# Patient Record
Sex: FEMALE | Race: WHITE | NOT HISPANIC OR LATINO | URBAN - METROPOLITAN AREA
[De-identification: names, ages, dates, MRNs, and addresses within clinical notes are randomized per-mention and may not be internally consistent; named-entity substitution may affect disease eponyms.]

---

## 2021-03-29 ENCOUNTER — HOSPITAL ENCOUNTER (OUTPATIENT)
Dept: FAMILY MEDICINE CLINIC | Facility: CLINIC | Age: 71
Discharge: HOME | End: 2021-03-29
Attending: FAMILY MEDICINE

## 2021-03-29 ENCOUNTER — CONVERSION ENCOUNTER (OUTPATIENT)
Dept: FAMILY MEDICINE CLINIC | Facility: CLINIC | Age: 71
End: 2021-03-29

## 2021-03-29 ENCOUNTER — OFFICE VISIT CONVERTED (OUTPATIENT)
Dept: FAMILY MEDICINE CLINIC | Facility: CLINIC | Age: 71
End: 2021-03-29
Attending: FAMILY MEDICINE

## 2021-03-29 LAB
25(OH)D3 SERPL-MCNC: 37.5 NG/ML (ref 30–100)
ANION GAP SERPL CALC-SCNC: 11 MMOL/L (ref 8–19)
BASOPHILS # BLD AUTO: 0.01 10*3/UL (ref 0–0.2)
BASOPHILS NFR BLD AUTO: 0.2 % (ref 0–3)
BUN SERPL-MCNC: 24 MG/DL (ref 5–25)
BUN/CREAT SERPL: 65 {RATIO} (ref 6–20)
CALCIUM SERPL-MCNC: 8.4 MG/DL (ref 8.7–10.4)
CHLORIDE SERPL-SCNC: 98 MMOL/L (ref 99–111)
CHOLEST SERPL-MCNC: 170 MG/DL (ref 107–200)
CHOLEST/HDLC SERPL: 2.7 {RATIO} (ref 3–6)
CONV ABS IMM GRAN: 0.03 10*3/UL (ref 0–0.2)
CONV CO2: 36 MMOL/L (ref 22–32)
CONV IMMATURE GRAN: 0.6 % (ref 0–1.8)
CREAT UR-MCNC: 0.37 MG/DL (ref 0.5–0.9)
DEPRECATED RDW RBC AUTO: 59.6 FL (ref 36.4–46.3)
EOSINOPHIL # BLD AUTO: 0.03 10*3/UL (ref 0–0.7)
EOSINOPHIL # BLD AUTO: 0.6 % (ref 0–7)
ERYTHROCYTE [DISTWIDTH] IN BLOOD BY AUTOMATED COUNT: 15.5 % (ref 11.7–14.4)
EST. AVERAGE GLUCOSE BLD GHB EST-MCNC: 117 MG/DL
GFR SERPLBLD BASED ON 1.73 SQ M-ARVRAT: >60 ML/MIN/{1.73_M2}
GLUCOSE SERPL-MCNC: 127 MG/DL (ref 65–99)
HBA1C MFR BLD: 5.7 % (ref 3.5–5.7)
HCT VFR BLD AUTO: 41.1 % (ref 37–47)
HDLC SERPL-MCNC: 64 MG/DL (ref 40–60)
HGB BLD-MCNC: 13.1 G/DL (ref 12–16)
LDLC SERPL CALC-MCNC: 92 MG/DL (ref 70–100)
LYMPHOCYTES # BLD AUTO: 0.52 10*3/UL (ref 1–5)
LYMPHOCYTES NFR BLD AUTO: 9.9 % (ref 20–45)
MCH RBC QN AUTO: 33.6 PG (ref 27–31)
MCHC RBC AUTO-ENTMCNC: 31.9 G/DL (ref 33–37)
MCV RBC AUTO: 105.4 FL (ref 81–99)
MONOCYTES # BLD AUTO: 0.17 10*3/UL (ref 0.2–1.2)
MONOCYTES NFR BLD AUTO: 3.2 % (ref 3–10)
NEUTROPHILS # BLD AUTO: 4.48 10*3/UL (ref 2–8)
NEUTROPHILS NFR BLD AUTO: 85.5 % (ref 30–85)
NRBC CBCN: 0 % (ref 0–0.7)
OSMOLALITY SERPL CALC.SUM OF ELEC: 298 MOSM/KG (ref 273–304)
PLATELET # BLD AUTO: 183 10*3/UL (ref 130–400)
PMV BLD AUTO: 11.3 FL (ref 9.4–12.3)
POTASSIUM SERPL-SCNC: 4.4 MMOL/L (ref 3.5–5.3)
RBC # BLD AUTO: 3.9 10*6/UL (ref 4.2–5.4)
SODIUM SERPL-SCNC: 141 MMOL/L (ref 135–147)
TRIGL SERPL-MCNC: 68 MG/DL (ref 40–150)
VLDLC SERPL-MCNC: 14 MG/DL (ref 5–37)
WBC # BLD AUTO: 5.24 10*3/UL (ref 4.8–10.8)

## 2021-03-30 LAB — HCV AB SER DONR QL: <0.1 S/CO RATIO (ref 0–0.9)

## 2021-04-05 ENCOUNTER — TELEMEDICINE CONVERTED (OUTPATIENT)
Dept: FAMILY MEDICINE CLINIC | Facility: CLINIC | Age: 71
End: 2021-04-05
Attending: FAMILY MEDICINE

## 2021-04-06 ENCOUNTER — HOSPITAL ENCOUNTER (OUTPATIENT)
Dept: OTHER | Facility: HOSPITAL | Age: 71
Discharge: HOME | End: 2021-04-06
Attending: FAMILY MEDICINE

## 2021-05-07 NOTE — PROGRESS NOTES
Progress Note      Patient Name: Naomi Lopes   Patient ID: [id number]   Sex: Female   YOB: 1950    Primary Care Provider: Isma Magallon DO   Referring Provider: Isma Magallon DO    Visit Date: April 5, 2021    Provider: Isma Magallon DO   Location: [facility]   Location Address: [address] [place] [address]   [place]  [address]   Location Phone: [phone]          Chief Complaint     FOLLOW UP       History Of Present Illness  TELEHEALTH TELEPHONE VISIT  Chief Complaint: FOLLOW UP VISIT   Naomi Lopes is a 70 year old /White female who is presenting for evaluation via telehealth video visit conducted via [employer] [place] [facility] [place]. Verbal consent obtained before beginning visit. Family members presents with the patient during the visit.   Provider spent 20 minutes with patient during telehealth visit.   The following staff were present during this visit: Isma Magallon DO   Past Medical History/Overview of Patient Symptoms     A.) F/U VISIT : The patient presented as a new patient during her most recent visit. History of CVA w/ hemiplegia, atrial fibrillation etc. She was referred to podiatry (for foot care) and cardiology. She is scheduled in office with both office during the upcoming weeks. She denies any concerns w/ the current dosages of her medications.     B.) TREMOR : Right-sided UE tremor. Patient reports sxs when attempting to perform tasks like hold a cup of coffee. She reports at least several months of sxs.       Past Medical History  Disease Name Date Onset Notes   Atrial fibrillation --  --    Cardiac Pacemaker --  [employer]   Cardiomyopathy --  --    CHF (congestive heart failure) --  --    CVA (cerebral vascular accident); history --  --    Depression --  --    Hyperlipidemia --  --    Mitral valve regurgitation --  --    Tachy-brian syndrome --  --    Tricuspid regurgitation --  --    Urinary Retention --  --          Past  Surgical History  Procedure Name Date Notes   Cardiac Catherization --  --    Hip Surgery --  --    Knee replacement, left --  --    Pacemaker --  --    Tubal ligation --  --          Medication List  Name Date Started Instructions   acetaminophen 500 mg oral tablet 03/29/2021 take 2 tablets (1,000 mg) by oral route every 4 hours as needed not to exceed 8 tablets per 24hrs   baclofen 10 mg oral tablet 03/29/2021 take 1 tablet by oral route 2 times a day for 90 days   Celexa 20 mg oral tablet 03/29/2021 take 1 tablet (20 mg) by oral route once daily in the morning for 90 days   dexamethasone 2 mg oral tablet  take 2 tablets by oral route 3 times a day   Flomax 0.4 mg oral capsule 03/29/2021 take 1 capsule (0.4 mg) by oral route once daily 1/2 hour following the same meal each day for 90 days   gabapentin 300 mg oral capsule 03/29/2021 take 2 capsules by oral route 2 times a day for 30 days   Lasix 40 mg oral tablet 03/29/2021 take 1 tablet (40 mg) by oral route 2 times per day for 90 days   metoprolol tartrate 25 mg oral tablet 03/29/2021 take 1 tablet (25 mg) by oral route once daily for 90 days   potassium chloride 20 mEq oral tablet extended release 03/29/2021 take 1 tablet (20 meq) by oral route 2 times per day with food for 90 days   Vitamin D3 25 mcg (1,000 unit) oral capsule 03/29/2021 take 1 capsule by oral route daily for 90 days   warfarin 3 mg oral tablet 03/29/2021 take 1 tablet (3 mg) by oral route once daily for 90 days   warfarin 4 mg oral tablet 03/29/2021 take 1 tablet (4 mg) by oral route once daily for 90 days         Allergy List  Allergen Name Date Reaction Notes   NO KNOWN DRUG ALLERGIES --  --  --          Family Medical History  Disease Name Relative/Age Notes   Congestive Heart Failure  --    Hypertension  --          Social History  Finding Status Start/Stop Quantity Notes   Tobacco Never --/-- --  --          Review of Systems  · Constitutional  o Denies  o : fatigue,  fever  · Eyes  o Denies  o : discharge from eye, redness  · HENT  o Denies  o : headaches, congestion  · Cardiovascular  o Denies  o : chest pain, palpitations  · Respiratory  o Denies  o : shortness of breath, wheezing, cough  · Gastrointestinal  o Denies  o : vomiting, diarrhea, constipation  · Genitourinary  o Denies  o : dysuria, hematuria  · Integument  o Denies  o : rash, new skin lesions  · Neurologic  o Admits  o : right sided UE tremor  o Denies  o : altered mental status, seizures  · Endocrine  o Denies  o : cold intolerance, heat intolerance  · Psychiatric  o Denies  o : anxiety, depression  · Heme-Lymph  o Denies  o : lymph node enlargement, tenderness      Physical Examination  · Constitutional  o Appearance  o : alert, in no acute distress  · Head and Face  o HEENT  o : hearing is normal for conversation   · Respiratory  o Respiratory Effort  o : breathing appears unlabored via video  · Skin and Subcutaneous Tissue  o General Inspection  o : normal tone  · Neurologic  o Mental Status Examination  o :   § Orientation  § : grossly oriented to person, place and time  · Psychiatric  o General  o : normal affect and calm          Assessment  · Atrial fibrillation     427.31/I48.91    A.) Patient will be establishing care with [facility] - intial visit scheduled for 4.6.21 - requested INR during that visit - family will call regarding INR - will inquire here in our office regarding at home INR monitoring.     · History of CVA (cerebrovascular accident)     V12.54/Z86.73    A.) See below.     · Hemiplegia     342.90/G81.90    A.) See below.     · Tremor     781.0/R25.1    A.) Will refer to neurology for an evaluation and recommendations.     · Pneumonitis     486/J18.9    A.) Post COVID - using LSELIE PRN once a day - unclear whether medication is needed. Advise to start to wean off. Contact physician with any concerns.     · Low serum vitamin C     790.6/R79.89    A.) Low vitamin C per patient's family -  prescribed vitamin C 500 mg in the past - will resend as noted.     · Low calcium levels     275.41/E83.51    A.) Labs reviewed with patient - low calcium level - will send supplement to pharmacy.         Plan  · Orders  o ACO-39: Current medications updated and reviewed (1159F, ) - - 04/05/2021  o NEUROLOGY CONSULTATION. (NEURO) - V12.54/Z86.73, 781.0/R25.1, 342.90/G81.90 - 04/05/2021  · Medications  o Vitamin C 500 mg oral tablet   SIG: take 1 tablet by oral route daily for 90 days   DISP: (90) Tablet with 3 refills  Prescribed on 04/05/2021     o Calcium 600 600 mg calcium (1,500 mg) oral tablet   SIG: take 1 tablet by oral route daily for 90 days   DISP: (90) Tablet with 3 refills  Prescribed on 04/05/2021     · Instructions  o Plan Of Care: See assessment section.  o Take all medications as prescribed/directed.  o SCHEDULE FOLLOW UP VISIT AFTER SEEING SPECIALISTS - END OF APRIL.            Electronically Signed by: Isma Magallon DO -Author on April 5, 2021 04:15:04 PM

## 2021-05-07 NOTE — PROGRESS NOTES
Progress Note      Patient Name: Naomi Lopes   Patient ID: [id number]   Sex: Female   YOB: 1950        Visit Date: March 29, 2021    Provider: Isma Magallon DO   Location: [facility]   Location Address: [address] [place] [address]   [place]  [address]   Location Phone: [phone]          Chief Complaint     Establish care, medication reconciliation       History Of Present Illness  Naomi Lopes is a 70 year old /White female who presents for evaluation and treatment of:      1.) ESTABLISH CARE : Recent move to [place] from [facility]. The patient presents with an extensive cardiac history, history of CVA, depression, etc. She was followed by PCP - Dr. Barbara Elizondo MD, cardiology - Dr. Javier Best MD.     2.) B/L LOWER EXREMITY PAIN : Prescribed Gabapentin - history of diffuse lower extremity pain + cramping (also prescribed baclofen). Reports that her sxs are stable on the medication. She is not requesting an increase in the dose of her rx.     3.) DEPRESSION : Stable. Prescribed Celexa - reports that her sxs are well-controlled on the rx. No concerning mood sxs c/o by patient during this visit.    4.) ATRIAL FIBRILLATION : Prescribed Coumadin - 4 mg. Reports weekly INR checks during time in [place] - unknown regarding stability of the patient's INR. She denies any palpitations during this visit.     5.) CARDIAC HX : Stable. No new heart sxs c/o per patient. No complaints regarding current medications. No complaints of difficulty in breathing.    6.) HISTORY OF CVA : Occurred 2015 w/ residual left-sided weakness.     7.) PREVENTIVE CARE : No known date of most recent labs.       Past Medical History  Disease Name Date Onset Notes   Atrial fibrillation --  --    Cardiac Pacemaker --  [employer]   Cardiomyopathy --  --    CHF (congestive heart failure) --  --    CVA (cerebral vascular accident); history --  --    Hyperlipidemia --  --    Mitral valve  regurgitation --  --    Tachy-brian syndrome --  --    Tricuspid regurgitation --  --          Past Surgical History  Procedure Name Date Notes   Cardiac Catherization --  --    Hip Surgery --  --    Knee replacement, left --  --    Pacemaker --  --    Tubal ligation --  --          Medication List  Name Date Started Instructions   acetaminophen 500 mg oral tablet  take 2 tablets (1,000 mg) by oral route every 4 hours as needed not to exceed 8 tablets per 24hrs   baclofen 10 mg oral tablet  take 1 tablet by oral route 2 times a day   Celexa 20 mg oral tablet  take 1 tablet (20 mg) by oral route once daily in the morning   dexamethasone 2 mg oral tablet  take 2 tablets by oral route 3 times a day   gabapentin 300 mg oral capsule  take 2 capsules by oral route 2 times a day   Lasix 40 mg oral tablet  take 1 tablet (40 mg) by oral route 2 times per day   potassium chloride 20 mEq oral tablet extended release  take 1 tablet (20 meq) by oral route 2 times per day with food   warfarin 4 mg oral tablet  take 1 tablet (4 mg) by oral route once daily         Allergy List  Allergen Name Date Reaction Notes   NO KNOWN DRUG ALLERGIES --  --  --          Family Medical History  Disease Name Relative/Age Notes   Congestive Heart Failure  --    Hypertension  --          Social History  Finding Status Start/Stop Quantity Notes   Tobacco Never --/-- --  --          Review of Systems  · Constitutional  o Denies  o : fever, chills  · Eyes  o Denies  o : double vision, blurred vision  · HENT  o Denies  o : nasal congestion, nasal discharge  · Cardiovascular  o Denies  o : chest pain, irregular heart beats  · Respiratory  o Denies  o : shortness of breath, productive cough  · Gastrointestinal  o Denies  o : nausea, vomiting  · Genitourinary  o Denies  o : dysuria, urinary retention  · Integument  o Denies  o : hair growth change, new skin lesions  · Neurologic  o Denies  o : altered mental status,  seizures  · Musculoskeletal  o Denies  o : joint swelling, limitation of motion  · Endocrine  o Denies  o : cold intolerance, heat intolerance  · Psychiatric  o Denies  o : hallucinations, delusions  · Heme-Lymph  o Denies  o : petechiae, lymph node enlargement or tenderness  · Allergic-Immunologic  o Denies  o : frequent illnesses      Vitals  Date Time BP Position Site L\R Cuff Size HR RR TEMP (F) WT  HT  BMI kg/m2 BSA m2 O2 Sat FR L/min FiO2 HC       03/29/2021 02:35 /70 Sitting    69 - R  97.1     96 %  21%          Physical Examination  · Constitutional  o Appearance  o : alert, in no acute distress, large body habitus   · Head and Face  o HEENT  o : hearing is normal for conversation   · Eyes  o Conjunctivae  o : conjunctivae normal  o Pupils and Irises  o : pupils equal and round  · Neck  o Inspection/Palpation  o : supple  o Thyroid  o : no thyromegaly  · Respiratory  o Respiratory Effort  o : breathing unlabored  o Auscultation of Lungs  o : decreased breath sounds  · Cardiovascular  o Heart  o :   § Auscultation of Heart  § : muffled heart sounds  o Peripheral Vascular System  o :   § Extremities  § : no cyanosis; thickened toenails  · Musculoskeletal  o General  o :   § General Musculoskeletal  § : residual left-sided weakness after stroke  · Skin and Subcutaneous Tissue  o General Inspection  o : area of ecchymosis appreciated of b/l UE  · Neurologic  o Gait and Station  o :   § Gait Screening  § : wheelchair-dependent   · Psychiatric  o Mood and Affect  o : flat affect           Assessment  · CHF (congestive heart failure)     428.0/I50.9    A.) Continue Lasix as prescribed. Refilled as noted.     · Atrial fibrillation     427.31/I48.91    A.) INR checked here in office today - 3.8 - decrease dose to 3 mg daily - goal is to recheck in 1 week via [employer].    · Cardiomyopathy     425.8/I43    A.) Will refer to cardiology as noted below.     · CVA (cerebral vascular accident);  history     434.91/I63.9    A.) See below.     · Mitral valve regurgitation     424.0/I34.0    A.) See above.     · Tricuspid regurgitation     397.0/I07.1    A.) See above.    · Tachy-brian syndrome     427.81/I49.5    A.) See above.     · Screening for lipid disorders     V77.91/Z13.220    A.) Lipid panel as noted.     · Encounter for hepatitis C screening test for low risk patient     V73.89/Z11.59    A.) Hepatitis C screen as noted.     · Screening for diabetes mellitus     V77.1/Z13.1    A.) A1C as noted.     · Low vitamin D level     790.6/R79.89    A.) Vitamin D ordered as noted. Refilled as noted.     · Weakness following cerebrovascular accident (CVA)       Other sequelae of cerebral infarction     438.89/I69.398  Weakness     438.89/R53.1    A.) Will consult with Amaya Sanchez RN, regarding resources available to the patient. Will consult [employer] as noted below, PT/OT/INR check etc.     · Urinary retention     788.20/R33.9    A.) Tamsulosin refilled as noted.     · Lower extremity pain     729.5/M79.606    A.) Controlled substance agreement signed. COLTON reviewed. Gabapentin refilled as noted.     · Thickened nail     703.8/L60.2    A.) Will refer to podiatry for nail and foot care.     · Lung disease     518.89/J98.4    A.) ? Lung disease - family if requesting a referral to pulmonology - advised to hold off until past records are obtained to determine indication for referral.        *Remaining chronic rx refilled as noted. Schedule 1 week follow up visit via video - for review of labs, and re-evaluation. *       Plan  · Orders  o BMP [facility] (10344) - 428.0/I50.9 - 03/29/2021  o CBC with Auto Diff [facility] (97308) - 428.0/I50.9 - 03/29/2021  o Hgb A1c [facility] (77505) - V77.1/Z13.1 - 03/29/2021  o Lipid Panel [facility] (85602) - V77.91/Z13.220 - 03/29/2021  o Vitamin D (25-Hydroxy) Level (87695) - 790.6/R79.89 - 03/29/2021  o ACO-39: Current medications updated and reviewed (1159F, ) - - 03/29/2021  o Hepatitis C  antibody (18894) - V73.89/Z11.59 - 03/29/2021  o IOP - INR (82904) - 427.31/I48.91 - 03/29/2021   3.8  o CARDIOLOGY CONSULTATION (CARDI) - 428.0/I50.9, 427.31/I48.91, 425.8/I43, 427.81/I49.5 - 03/29/2021   Please schedule for ASAP.   Please send all previous cardiology notes uploaded to chart.  o PODIATRY CONSULTATION (PODIA) - 703.8/L60.2, 438.89/I69.398, 438.89/R53.1 - 03/29/2021   Need foot care.   o Home Health Consultation (HOMHE) - 434.91/I63.9, 438.89/I69.398, 438.89/R53.1 - 03/29/2021   Needs to be evaluated for PT/OT. Would like to have her INR checked at home.    Please schedule for ASAP.   · Medications  o metoprolol tartrate 25 mg oral tablet   SIG: take 1 tablet (25 mg) by oral route once daily for 90 days   DISP: (90) Tablet with 1 refills  Prescribed on 03/29/2021     o Vitamin D3 25 mcg (1,000 unit) oral capsule   SIG: take 1 capsule by oral route daily for 90 days   DISP: (90) Capsule with 3 refills  Prescribed on 03/29/2021     o Flomax 0.4 mg oral capsule   SIG: take 1 capsule (0.4 mg) by oral route once daily 1/2 hour following the same meal each day for 90 days   DISP: (90) Capsule with 3 refills  Prescribed on 03/29/2021     o warfarin 3 mg oral tablet   SIG: take 1 tablet (3 mg) by oral route once daily for 90 days   DISP: (90) Tablet with 3 refills  Prescribed on 03/29/2021     o acetaminophen 500 mg oral tablet   SIG: take 2 tablets (1,000 mg) by oral route every 4 hours as needed not to exceed 8 tablets per 24hrs   DISP: (90) Tablet with 2 refills  Adjusted on 03/29/2021     o baclofen 10 mg oral tablet   SIG: take 1 tablet by oral route 2 times a day for 90 days   DISP: (180) Tablet with 1 refills  Adjusted on 03/29/2021     o Celexa 20 mg oral tablet   SIG: take 1 tablet (20 mg) by oral route once daily in the morning for 90 days   DISP: (90) Tablet with 1 refills  Adjusted on 03/29/2021     o gabapentin 300 mg oral capsule   SIG: take 2 capsules by oral route 2 times a day for 30 days    DISP: (120) Capsule with 2 refills  Adjusted on 03/29/2021     o Lasix 40 mg oral tablet   SIG: take 1 tablet (40 mg) by oral route 2 times per day for 90 days   DISP: (180) Tablet with 1 refills  Adjusted on 03/29/2021     o potassium chloride 20 mEq oral tablet extended release   SIG: take 1 tablet (20 meq) by oral route 2 times per day with food for 90 days   DISP: (180) Tablet with 1 refills  Adjusted on 03/29/2021     o warfarin 4 mg oral tablet   SIG: take 1 tablet (4 mg) by oral route once daily for 90 days   DISP: (90) Tablet with 1 refills  Adjusted on 03/29/2021     o Medications have been Reconciled  o Transition of Care or Provider Policy  · Instructions  o Take all medications as prescribed/directed.  o Patient was educated/instructed on their diagnosis, treatment and medications prior to discharge from the clinic today.  o Time In: 2:50  o Time Out: 3:30  o FOLLOW UP VIA VIDEO VISIT IN 1 WEEK.             Electronically Signed by: Isma Magallon DO - on March 30, 2021 01:09:20 PM

## 2021-05-09 VITALS
DIASTOLIC BLOOD PRESSURE: 70 MMHG | OXYGEN SATURATION: 96 % | HEART RATE: 69 BPM | TEMPERATURE: 97.1 F | SYSTOLIC BLOOD PRESSURE: 116 MMHG